# Patient Record
Sex: FEMALE | Race: WHITE | URBAN - METROPOLITAN AREA
[De-identification: names, ages, dates, MRNs, and addresses within clinical notes are randomized per-mention and may not be internally consistent; named-entity substitution may affect disease eponyms.]

---

## 2024-04-30 DIAGNOSIS — Z87.74 H/O MUSTARD PROCEDURE: ICD-10-CM

## 2024-04-30 DIAGNOSIS — Q20.3 TRANSPOSITION OF GREAT ARTERIES (HHS-HCC): ICD-10-CM

## 2024-04-30 DIAGNOSIS — I10 HYPERTENSION, UNSPECIFIED TYPE: ICD-10-CM

## 2024-04-30 DIAGNOSIS — I50.20 HEART FAILURE WITH REDUCED EJECTION FRACTION (MULTI): ICD-10-CM

## 2024-04-30 PROBLEM — Q24.9 CONGENITAL HEART DEFECT (HHS-HCC): Status: ACTIVE | Noted: 2024-04-30

## 2024-04-30 PROBLEM — N28.0 RENAL INFARCTION (MULTI): Status: ACTIVE | Noted: 2017-05-19

## 2024-04-30 PROBLEM — Z90.81 HISTORY OF SPLENECTOMY: Status: ACTIVE | Noted: 2019-04-17

## 2024-04-30 RX ORDER — FLUOXETINE 10 MG/1
10 CAPSULE ORAL DAILY
COMMUNITY
Start: 2024-02-28

## 2024-04-30 RX ORDER — RIVAROXABAN 10 MG/1
10 TABLET, FILM COATED ORAL DAILY
COMMUNITY

## 2024-04-30 RX ORDER — METFORMIN HYDROCHLORIDE 500 MG/1
500 TABLET, EXTENDED RELEASE ORAL
COMMUNITY
Start: 2024-04-13

## 2024-04-30 RX ORDER — HYDROCHLOROTHIAZIDE 25 MG/1
25 TABLET ORAL DAILY
COMMUNITY

## 2024-04-30 RX ORDER — LISINOPRIL 40 MG/1
40 TABLET ORAL DAILY
COMMUNITY

## 2024-06-03 ENCOUNTER — TELEPHONE (OUTPATIENT)
Dept: PEDIATRIC CARDIOLOGY | Facility: HOSPITAL | Age: 44
End: 2024-06-03
Payer: COMMERCIAL

## 2024-06-03 NOTE — PROGRESS NOTES
Patient Name: Juana Moreland   Provider: Sj Holder MD  : 1980  Date of Service: 2024    DIAGNOSES:     1. D-TGA  a. s/p Mustard Procedure (Operative details unavailable)  [place]   2. HFrEF  a. Echo 2015 (Scanned) shows RVEF (systemic ventricle) 45%  b. Echo 22 RVEF mildly reduced  c. Echo 2023 RVEF mildly reduced, unchanged  d. TTE 6/3/24 dilated hypertrophied RV with mild reduced systolic function, no change  3. HTN   a. On Lisinopril, HCTZ  4. H/o Loop Recorder Implant  a. Implanted x2 years with explant 2022  b. No arrhythmias  5. Renal Infarct      INTERVAL HISTORY:     I saw Juana Moreland today in the [facility] for Adults with Congenital Heart Disease, [facility] and [facility] [place] at [facility].    Juana is a 44 y/oF with D TGA, s/p Mustard procedure () in [place], [place]. She followed with a pediatric cardiologist in [place] until she moved to [place] . She followed at [facility], throughout both her pregnancies, up until spring 2022.. She underwent a Loop Recorder implant a few years ago because of unexplained renal infarct with explant  which showed no arrhythmias.     An echo was obtained in  which showed a reduced EF (RVEF 45%) of her systemic ventricle. She is currently being managed for HTN with Lisinopril and HCTZ. She also is taking Xarelto for the history of renal infarct.  She underwent sleep study in  which was negative for JAMIR.  LE edema in the past but resolved with hydrochlorothiazide.     She was last seen in  by me.  She had a resting bradycardia.  She had an exercise test during she achieved 11 METS with normal BP and HR response.  No med changes were made.    Today she reports that her dizzy spells with exertion have returned - one while walking to [employer].  She is also having recent palpitations while in bed.  No syncope.  No SOB or FONG.  " She also gets dizzy when getting up to urinate at night - this is new.  Takes BP meds at bedtime.  No other concerns.        ROS:   General: no fatigue, no fever, no weight loss, no excessive sweating, no decreased appetite  HEENT: no facial swelling, no hoarseness, no eye redness, no eye lid swelling  Cardiac: no fainting, no cyanosis, no chest pain, no palpitations  Respiratory: no shortness of breath, no coughing blood, no noisy breathing, no wheezing, no cough  GI: No abdomen pain, no constipation, no diarrhea, no vomiting  Musculoskeletal: no extremity swelling  Skin: no paleness, no rash, no yellow skin  Hematologic: no easy bruising, no easy bleeding  Neurologic: no seizures, no weakness    All systems negative unless otherwise stated.    CURRENT MEDS:    Current Outpatient Medications:     FLUoxetine (PROzac) 10 mg capsule, Take 1 capsule (10 mg) by mouth once daily., Disp: , Rfl:     hydroCHLOROthiazide (HYDRODiuril) 25 mg tablet, Take 1 tablet (25 mg) by mouth once daily. as directed, Disp: , Rfl:     lisinopril 40 mg tablet, Take 1 tablet (40 mg) by mouth once daily. as directed, Disp: , Rfl:     metFORMIN  mg 24 hr tablet, Take 1 tablet (500 mg) by mouth 2 times daily (morning and late afternoon)., Disp: , Rfl:     multivitamin tablet, Take 1 tablet by mouth once daily., Disp: , Rfl:     Xarelto 10 mg tablet, Take 1 tablet (10 mg) by mouth once daily., Disp: , Rfl:     PHYSICAL EXAM:  /73 (BP Location: Right arm)   Pulse 63   Ht 1.643 m (5' 4.69\")   Wt 74.8 kg (164 lb 14.5 oz)   SpO2 96%   BMI 27.71 kg/m²   Body mass index is 27.71 kg/m².    General: Well appearing and in no distress  HEENT: Moist mucous membranes  Neck: Supple, JVP normal, Carotid upstrokes brisk bilaterally and without bruits  Respiratory: Clear to ausculation bilaterally; no wheezing/rales/rhonchi; respirations non-labored  Cardiovascular: RRR,normal PMI, normal S1 and S2. No murmurs, gallops or rubs. " "  Gastrointestinal: soft, non-tender, no organomegaly, no abnormal aortic pulsation  Extremities: warm and well perfused with no cyanosis, clubbing, or edema  Neurologic: awake/alert, no focal deficits    DATA:    TTE 6/5/24 personally reviewed by me in clinic today:  Dilated hypertrophied RV with mild reduced systolic function, no change  Small D-shaped LV with normal function  Trivial TR  Phasic flow in atrial baffle    ECG 6/5/24  NSR at 66 with PAC, RVH with repol    Echo 6/21/2023 images reviewed and interpreted by Sj Holder MD  D TGA s/p Mustard  small hyperdynamic LV, D shaped LV  DIlated and hypertrophied RV with mildly decreased systolic function  Trivial to Mild TR  Phasic flow in atrial baffle     EKG 6/21/2023  Sinus Bradycardia with sinus arrhythmia  Left axis deviation   RVH     Echo 6/21/2022 images reviewed and interpreted by MD ELENI Fowler TGA s/p atrial switch  dilated and hypertrophied systemic RV  Mild TR (systemic AV valve)  Small and de shaped subpulmonic LV  Mildly reduced RV function (systemic)        Echo 9/3/2015  Normal LVEF  RVEF 45 %  Dilated RV/RA  Trival AR, mild MR, TR, no baffle shunt     Cardiac MRI 10/28/2020  RV EDVi 135 ml (Systemic)  LV EDVi 34 ml (Pulmonary)  Mild systemic RV dysfunction (40%)  No baffle leak         No results found for: \"WBC\", \"HGB\", \"MCV\", \"LABPLAT\"  No results found for: \"CREATININE\"    No components found for: \"MAGNESIUM\"  No results found for: \"PROT\", \"ALBUMIN\", \"ALT\", \"AST\"  No results found for: \"INR\", \"APTT\"      ASSESSMENT & PLAN:      1. D- TGA  s/p surgical repair in [place], [place]  Mustard Procedure     Echo today shows no issues with the systemic or pulmonary venous baffle/pathway.     F/u 1 year with Echo        2. H/O HFrEF  Decreased systemic RV function  Echo today shows mildly reduced RV systolic function.  Unchanged.         Overall she is doing exceptionally well without cardiac signs or symptoms.     Although GDMT has not " been defined for a systemic RV, she is currently only on ACE-I. Her HR is slow today and although we previously ruled out chronotropic incompetence, BB initiation is not an option given the significant resting bradycardia. She may benefit from addition of mineralocorticoid antagonism and SGLT2i. For now, given euvolemia and efficacy of current regimen for HTN and prior LE edema, we will make no changes and continue to evaluate as we move forward.      f/u 1 year with Echo     3. HTN  On Lisinpril, HCTZ with good control  BP today: 116/73     No changes     4. Sinus Bradycardia  Sinus bradycardia at last few office visits.   Reports of near syncope with exercise have returned along with some short lived palpitations.    - 14 day Holter today     Normal HR response to exercise     5. Renal infarct  Currently on Xarelto 10 mg daily  Will continue this medication     Next visit one year with echo    Thank you for allowing me to participate in the care of this patient.  Please don't hesitate to contact us with any questions or concerns.    Sincerely,    Sj Holder MD, MSc  Director, Adult Congenital Heart Disease Program  [facility]  [facility] [place]

## 2024-06-03 NOTE — TELEPHONE ENCOUNTER
06/03/24 at 12:01 PM     Spoke with: Patient   [phone]     Let Juana know Dr. Holder said she can come in this Wednesday, transferred to coordinator for scheduling.    - Tamra Wagner RN  [facility] Patient Navigator  [phone]

## 2024-06-03 NOTE — TELEPHONE ENCOUNTER
"06/03/24 at 11:18 AM     Spoke with: Patient   [phone]     Returned patient call about concerning symptoms.  Episodes started 3 nights ago (Thursday night into Friday), she woke up in the middle of the night to pee because she takes her lisinopril and hydrochlorothiazide in the evening, and she felt extremely heavy, a fluttering feeling in her chest, tingling. She went back to bed without further issue.  She felt fine all day Friday, skipped her lisinopril that night only, and had no issues over night.   Saturday, she was in a parade and felt \"ridiculous\" amount of fluttering in chest.   Sunday, when she woke up in the morning, she was sweating, felt pale, so she drank water and went to [employer]. She could barely make it from her car to the pew, felt like she was going to pass out. She made herself sit still and rest the rest of the day, and feels fine so far today.   These events happened a few years ago. They placed a loop recorder that didn't find anything. She said at that time she smoked and \"did stupid stuff\" that she no longer does, so she thought that caused those issues.   She has recently been eating very salty foods due to strong cravings, and agreed that consuming that much salt might be contributing to these symptoms.     She asked if she should come in earlier than her 6/19 appointment. Let her know I will update Dr. Holder and get back to her.    - Tamra Wagner RN  [facility]  [phone]    "

## 2024-06-04 ENCOUNTER — TELEPHONE (OUTPATIENT)
Dept: PEDIATRIC CARDIOLOGY | Facility: HOSPITAL | Age: 44
End: 2024-06-04
Payer: COMMERCIAL

## 2024-06-04 RX ORDER — BISMUTH SUBSALICYLATE 262 MG
1 TABLET,CHEWABLE ORAL DAILY
COMMUNITY

## 2024-06-04 NOTE — PATIENT INSTRUCTIONS
Your echo today is unchanged from the last few.  Overall good function - just mildly decreased.  No concerns.  No medication changes.  Check your blood pressure when you fell dizzy.  We will place a heart rhythm monitor today for 2 weeks.  Next visit one year with an echo.    [employer] Holter heart rhythm monitor:  We have applied a heart rhythm monitor to you today. Please wear it for 14 days.  The heart monitor may be worn during all activities including swimming up to 3ft (~1m) of water.  The monitor requires no special accommodation or restrictions. In the event the monitor is making a sound, this indicates a battery or skin contact issue.   After removing the monitor, return it in the supplied box via UPS. If you have any questions or need assistance with the Holter monitor, call [employer] at:  Patient services - [phone] (24 hours a day, 7 days a week)  [email or web address]  We will contact you with results; please allow up to two weeks from the time you send back the device.      Follow up with: Sj Holder MD    Date: 25   Time: Echo & EK:00  Appointment: 2:00   Location: [facility], [facility] - [address] [place] [address], [id number]   Recommendations:   Endocarditis prophylaxis: You do not need antibiotics before dental cleanings and procedures. Please see the dentist every 6 months for a teeth cleaning.     [facility] program contact information:  [facility] line: [phone]  [facility] Coordinator: [phone] (scheduling)  After hours: call [phone] to page on-call pediatric cardiology fellow at [phone]  Email: [email or web address]  MyChart [place]  **If your pharmacy has any problems requesting refills from us for your cardiac medications, please contact us.   
Vi

## 2024-06-04 NOTE — TELEPHONE ENCOUNTER
06/04/24    Spoke with: Patient   [phone]     Confirmed understanding of appointment details in previous note.    Reviewed medications and allergies.     Encouraged reaching out if there was anything else needed.      - Tamra Wagner RN  [facility] Patient Navigator  [phone]

## 2024-06-04 NOTE — TELEPHONE ENCOUNTER
24 at 11:23 AM     Attempted to reach: Patient   [phone]     Call went to voicemail, planned to leave message confirming appointment as follows:  Provider: Dr. Holder  Location:  [facility]  Date:   Time: Echo & EK                Appointment: 2    Call went to voicemail, unable to leave message.        - Tamra Wagner RN  [facility] Patient Navigator  [phone]

## 2024-06-05 ENCOUNTER — ANCILLARY PROCEDURE (OUTPATIENT)
Dept: PEDIATRIC CARDIOLOGY | Facility: HOSPITAL | Age: 44
End: 2024-06-05
Payer: COMMERCIAL

## 2024-06-05 ENCOUNTER — OFFICE VISIT (OUTPATIENT)
Dept: PEDIATRIC CARDIOLOGY | Facility: HOSPITAL | Age: 44
End: 2024-06-05
Payer: COMMERCIAL

## 2024-06-05 ENCOUNTER — HOSPITAL ENCOUNTER (OUTPATIENT)
Dept: PEDIATRIC CARDIOLOGY | Facility: HOSPITAL | Age: 44
Discharge: HOME | End: 2024-06-05
Payer: COMMERCIAL

## 2024-06-05 VITALS
BODY MASS INDEX: 27.47 KG/M2 | OXYGEN SATURATION: 96 % | HEART RATE: 63 BPM | SYSTOLIC BLOOD PRESSURE: 116 MMHG | HEIGHT: 65 IN | DIASTOLIC BLOOD PRESSURE: 73 MMHG | WEIGHT: 164.9 LBS

## 2024-06-05 VITALS
HEIGHT: 65 IN | WEIGHT: 164.9 LBS | OXYGEN SATURATION: 96 % | HEART RATE: 63 BPM | BODY MASS INDEX: 27.47 KG/M2 | SYSTOLIC BLOOD PRESSURE: 116 MMHG | DIASTOLIC BLOOD PRESSURE: 73 MMHG

## 2024-06-05 DIAGNOSIS — Z87.74 H/O MUSTARD PROCEDURE: ICD-10-CM

## 2024-06-05 DIAGNOSIS — Q20.3 TRANSPOSITION OF GREAT ARTERIES (HHS-HCC): Primary | ICD-10-CM

## 2024-06-05 DIAGNOSIS — I10 HYPERTENSION, UNSPECIFIED TYPE: ICD-10-CM

## 2024-06-05 DIAGNOSIS — Q20.3 TRANSPOSITION OF GREAT ARTERIES (HHS-HCC): ICD-10-CM

## 2024-06-05 DIAGNOSIS — R00.2 PALPITATIONS: ICD-10-CM

## 2024-06-05 DIAGNOSIS — I50.20 HEART FAILURE WITH REDUCED EJECTION FRACTION (MULTI): ICD-10-CM

## 2024-06-05 LAB
AORTIC VALVE PEAK VELOCITY: 1.3 M/S
ATRIAL RATE: 66 BPM
AV PEAK GRADIENT: 6.8 MMHG
BODY SURFACE AREA: 1.85 M2
MITRAL VALVE E/A RATIO: 1.81
MITRAL VALVE E/E' RATIO: 4.78
P AXIS: 89 DEGREES
P OFFSET: 204 MS
P ONSET: 161 MS
PR INTERVAL: 126 MS
PULMONIC VALVE PEAK GRADIENT: 4.9 MMHG
Q ONSET: 224 MS
QRS COUNT: 11 BEATS
QRS DURATION: 80 MS
QT INTERVAL: 456 MS
QTC CALCULATION(BAZETT): 478 MS
QTC FREDERICIA: 471 MS
R AXIS: 128 DEGREES
T AXIS: -86 DEGREES
T OFFSET: 452 MS
TRICUSPID ANNULAR PLANE SYSTOLIC EXCURSION: 1.3 CM
VENTRICULAR RATE: 66 BPM

## 2024-06-05 PROCEDURE — 93246 EXT ECG>7D<15D RECORDING: CPT

## 2024-06-05 PROCEDURE — 93303 ECHO TRANSTHORACIC: CPT | Performed by: PEDIATRICS

## 2024-06-05 PROCEDURE — 93325 DOPPLER ECHO COLOR FLOW MAPG: CPT | Performed by: PEDIATRICS

## 2024-06-05 PROCEDURE — 93320 DOPPLER ECHO COMPLETE: CPT

## 2024-06-05 PROCEDURE — 93010 ELECTROCARDIOGRAM REPORT: CPT | Performed by: INTERNAL MEDICINE

## 2024-06-05 PROCEDURE — 93320 DOPPLER ECHO COMPLETE: CPT | Performed by: PEDIATRICS

## 2024-06-05 PROCEDURE — 99214 OFFICE O/P EST MOD 30 MIN: CPT | Performed by: INTERNAL MEDICINE

## 2024-06-05 PROCEDURE — 93005 ELECTROCARDIOGRAM TRACING: CPT

## 2024-06-05 PROCEDURE — 3074F SYST BP LT 130 MM HG: CPT | Performed by: INTERNAL MEDICINE

## 2024-06-05 PROCEDURE — 3078F DIAST BP <80 MM HG: CPT | Performed by: INTERNAL MEDICINE

## 2024-06-05 NOTE — LETTER
2024     Luis Adan DO  [address] [facility] [address]    Patient: Juana Moreland   YOB: 1980   Date of Visit: 2024       Dear Dr. Luis Adan DO:    Thank you for referring Juana Moreland to me for evaluation. Below are my notes for this consultation.  If you have questions, please do not hesitate to call me. I look forward to following your patient along with you.       Sincerely,     Sj Holder MD      CC: No Recipients  ______________________________________________________________________________________    Patient Name: Juana Moreland   Provider: Sj Holder MD  : 1980  Date of Service: 2024    DIAGNOSES:     1. D-TGA  a. s/p Mustard Procedure (Operative details unavailable)  [place]   2. HFrEF  a. Echo 2015 (Scanned) shows RVEF (systemic ventricle) 45%  b. Echo 22 RVEF mildly reduced  c. Echo 2023 RVEF mildly reduced, unchanged  d. TTE 6/3/24 dilated hypertrophied RV with mild reduced systolic function, no change  3. HTN   a. On Lisinopril, HCTZ  4. H/o Loop Recorder Implant  a. Implanted x2 years with explant 2022  b. No arrhythmias  5. Renal Infarct      INTERVAL HISTORY:     I saw Juana Moreland today in the Center for Adults with Congenital Heart Disease, [facility] and [facility] [place] at [facility].    Juana is a 44 y/oF with D TGA, s/p Mustard procedure () in [place], [place]. She followed with a pediatric cardiologist in [place] until she moved to [place] . She followed at [facility], throughout both her pregnancies, up until spring 2022.. She underwent a Loop Recorder implant a few years ago because of unexplained renal infarct with explant  which showed no arrhythmias.     An echo was obtained in  which showed a reduced EF (RVEF 45%) of her systemic ventricle. She is currently being managed for  HTN with Lisinopril and HCTZ. She also is taking Xarelto for the history of renal infarct.  She underwent sleep study in 2023 which was negative for JAMIR.  LE edema in the past but resolved with hydrochlorothiazide.     She was last seen in 6/23 by me.  She had a resting bradycardia.  She had an exercise test during she achieved 11 METS with normal BP and HR response.  No med changes were made.    Today she reports that her dizzy spells with exertion have returned - one while walking to [employer].  She is also having recent palpitations while in bed.  No syncope.  No SOB or FONG.  She also gets dizzy when getting up to urinate at night - this is new.  Takes BP meds at bedtime.  No other concerns.        ROS:   General: no fatigue, no fever, no weight loss, no excessive sweating, no decreased appetite  HEENT: no facial swelling, no hoarseness, no eye redness, no eye lid swelling  Cardiac: no fainting, no cyanosis, no chest pain, no palpitations  Respiratory: no shortness of breath, no coughing blood, no noisy breathing, no wheezing, no cough  GI: No abdomen pain, no constipation, no diarrhea, no vomiting  Musculoskeletal: no extremity swelling  Skin: no paleness, no rash, no yellow skin  Hematologic: no easy bruising, no easy bleeding  Neurologic: no seizures, no weakness    All systems negative unless otherwise stated.    CURRENT MEDS:    Current Outpatient Medications:   •  FLUoxetine (PROzac) 10 mg capsule, Take 1 capsule (10 mg) by mouth once daily., Disp: , Rfl:   •  hydroCHLOROthiazide (HYDRODiuril) 25 mg tablet, Take 1 tablet (25 mg) by mouth once daily. as directed, Disp: , Rfl:   •  lisinopril 40 mg tablet, Take 1 tablet (40 mg) by mouth once daily. as directed, Disp: , Rfl:   •  metFORMIN  mg 24 hr tablet, Take 1 tablet (500 mg) by mouth 2 times daily (morning and late afternoon)., Disp: , Rfl:   •  multivitamin tablet, Take 1 tablet by mouth once daily., Disp: , Rfl:   •  Xarelto 10 mg tablet, Take 1  "tablet (10 mg) by mouth once daily., Disp: , Rfl:     PHYSICAL EXAM:  /73 (BP Location: Right arm)   Pulse 63   Ht 1.643 m (5' 4.69\")   Wt 74.8 kg (164 lb 14.5 oz)   SpO2 96%   BMI 27.71 kg/m²   Body mass index is 27.71 kg/m².    General: Well appearing and in no distress  HEENT: Moist mucous membranes  Neck: Supple, JVP normal, Carotid upstrokes brisk bilaterally and without bruits  Respiratory: Clear to ausculation bilaterally; no wheezing/rales/rhonchi; respirations non-labored  Cardiovascular: RRR,normal PMI, normal S1 and S2. No murmurs, gallops or rubs.   Gastrointestinal: soft, non-tender, no organomegaly, no abnormal aortic pulsation  Extremities: warm and well perfused with no cyanosis, clubbing, or edema  Neurologic: awake/alert, no focal deficits    DATA:    TTE 6/5/24 personally reviewed by me in clinic today:  Dilated hypertrophied RV with mild reduced systolic function, no change  Small D-shaped LV with normal function  Trivial TR  Phasic flow in atrial baffle    ECG 6/5/24  NSR at 67 with PAC, RVH with repol    Echo 6/21/2023 images reviewed and interpreted by MD ELENI Fowler TGA s/p Mustard  small hyperdynamic LV, D shaped LV  DIlated and hypertrophied RV with mildly decreased systolic function  Trivial to Mild TR  Phasic flow in atrial baffle     EKG 6/21/2023  Sinus Bradycardia with sinus arrhythmia  Left axis deviation   RVH     Echo 6/21/2022 images reviewed and interpreted by Sj Holder MD     D TGA s/p atrial switch  dilated and hypertrophied systemic RV  Mild TR (systemic AV valve)  Small and de shaped subpulmonic LV  Mildly reduced RV function (systemic)        Echo 9/3/2015  Normal LVEF  RVEF 45 %  Dilated RV/RA  Trival AR, mild MR, TR, no baffle shunt     Cardiac MRI 10/28/2020  RV EDVi 135 ml (Systemic)  LV EDVi 34 ml (Pulmonary)  Mild systemic RV dysfunction (40%)  No baffle leak         No results found for: \"WBC\", \"HGB\", \"MCV\", \"LABPLAT\"  No results found for: " "\"CREATININE\"    No components found for: \"MAGNESIUM\"  No results found for: \"PROT\", \"ALBUMIN\", \"ALT\", \"AST\"  No results found for: \"INR\", \"APTT\"      ASSESSMENT & PLAN:      1. D- TGA  s/p surgical repair in [place], [place]  Mustard Procedure     Echo today shows no issues with the systemic or pulmonary venous baffle/pathway.     F/u 1 year with Echo        2. H/O HFrEF  Decreased systemic RV function  Echo today shows mildly reduced RV systolic function.  Unchanged.         Overall she is doing exceptionally well without cardiac signs or symptoms.     Although GDMT has not been defined for a systemic RV, she is currently only on ACE-I. Her HR is slow today and although we previously ruled out chronotropic incompetence, BB initiation is not an option given the significant resting bradycardia. She may benefit from addition of mineralocorticoid antagonism and SGLT2i. For now, given euvolemia and efficacy of current regimen for HTN and prior LE edema, we will make no changes and continue to evaluate as we move forward.      f/u 1 year with Echo     3. HTN  On Lisinpril, HCTZ with good control  BP today: 116/73     No changes     4. Sinus Bradycardia  Sinus bradycardia at last few office visits.   Reports of near syncope with exercise have returned along with some short lived palpitations.    - 14 day Holter today     Normal HR response to exercise     5. Renal infarct  Currently on Xarelto 10 mg daily  Will continue this medication     Next visit one year with echo    Thank you for allowing me to participate in the care of this patient.  Please don't hesitate to contact us with any questions or concerns.    Sincerely,    Sj Holder MD, MSc  Director, Adult Congenital Heart Disease Program  [facility] [place]     "

## 2024-06-19 ENCOUNTER — APPOINTMENT (OUTPATIENT)
Dept: PEDIATRIC CARDIOLOGY | Facility: HOSPITAL | Age: 44
End: 2024-06-19
Payer: COMMERCIAL

## 2024-07-10 LAB — BODY SURFACE AREA: 1.85 M2

## 2024-07-11 ENCOUNTER — TELEPHONE (OUTPATIENT)
Dept: PEDIATRIC CARDIOLOGY | Facility: HOSPITAL | Age: 44
End: 2024-07-11
Payer: COMMERCIAL

## 2024-07-11 NOTE — TELEPHONE ENCOUNTER
----- Message from Sj Holder sent at 7/10/2024 10:34 AM EDT -----  Let her know that her monitor showed a hand full of short runs of fast heart beat - no more than 6 beats and then a few extra beats.  She had slow heart rate at times - which we know about, but nothing serious.  She did not trigger for symptoms so I don't know if any of these things are related to her palpitations or dizziness.  Overall, nothing of significant concern on the monitor.

## 2024-07-11 NOTE — TELEPHONE ENCOUNTER
07/11/24 at 4:07 PM     Spoke with: Patient   [phone]     Relayed heart monitor results, which were reassuring. Reviewed the times of fast beats, they weren't times she felt anything. Patient confirmed understanding, no further questions or issues to be addressed. Encouraged reaching out if there's anything else needed.       - Tamra Wagner RN  [facility]  [phone]

## 2024-08-09 DIAGNOSIS — N28.0 RENAL INFARCTION (MULTI): ICD-10-CM

## 2024-08-09 RX ORDER — RIVAROXABAN 10 MG/1
10 TABLET, FILM COATED ORAL DAILY
Qty: 90 TABLET | Refills: 3 | Status: SHIPPED | OUTPATIENT
Start: 2024-08-09 | End: 2025-08-09

## 2024-09-10 DIAGNOSIS — I10 HYPERTENSION, UNSPECIFIED TYPE: ICD-10-CM

## 2024-09-10 RX ORDER — LISINOPRIL 40 MG/1
40 TABLET ORAL DAILY
Qty: 90 TABLET | Refills: 3 | Status: SHIPPED | OUTPATIENT
Start: 2024-09-10 | End: 2025-09-10

## 2025-06-04 ENCOUNTER — APPOINTMENT (OUTPATIENT)
Dept: PEDIATRIC CARDIOLOGY | Facility: HOSPITAL | Age: 45
End: 2025-06-04
Payer: COMMERCIAL

## 2025-06-17 DIAGNOSIS — Q20.3 TRANSPOSITION OF GREAT ARTERIES (HHS-HCC): ICD-10-CM

## 2025-06-17 DIAGNOSIS — Z87.74 H/O MUSTARD PROCEDURE: ICD-10-CM

## 2025-09-03 ENCOUNTER — TELEPHONE (OUTPATIENT)
Dept: PEDIATRIC CARDIOLOGY | Facility: HOSPITAL | Age: 45
End: 2025-09-03
Payer: COMMERCIAL

## 2025-09-04 ENCOUNTER — OFFICE VISIT (OUTPATIENT)
Dept: PEDIATRIC CARDIOLOGY | Facility: HOSPITAL | Age: 45
End: 2025-09-04
Payer: COMMERCIAL

## 2025-09-04 ENCOUNTER — HOSPITAL ENCOUNTER (OUTPATIENT)
Dept: PEDIATRIC CARDIOLOGY | Facility: HOSPITAL | Age: 45
Discharge: HOME | End: 2025-09-04
Payer: COMMERCIAL

## 2025-09-04 ENCOUNTER — ANCILLARY PROCEDURE (OUTPATIENT)
Dept: PEDIATRIC CARDIOLOGY | Facility: HOSPITAL | Age: 45
End: 2025-09-04
Payer: COMMERCIAL

## 2025-09-04 VITALS
DIASTOLIC BLOOD PRESSURE: 77 MMHG | OXYGEN SATURATION: 98 % | BODY MASS INDEX: 27.51 KG/M2 | SYSTOLIC BLOOD PRESSURE: 129 MMHG | HEIGHT: 65 IN | WEIGHT: 165.12 LBS | HEART RATE: 58 BPM

## 2025-09-04 DIAGNOSIS — I50.20 HEART FAILURE WITH REDUCED EJECTION FRACTION: ICD-10-CM

## 2025-09-04 DIAGNOSIS — R00.1 BRADYCARDIA: ICD-10-CM

## 2025-09-04 DIAGNOSIS — Q20.3 TRANSPOSITION OF GREAT ARTERIES (HHS-HCC): Primary | ICD-10-CM

## 2025-09-04 DIAGNOSIS — Q20.3 TRANSPOSITION OF GREAT ARTERIES (HHS-HCC): ICD-10-CM

## 2025-09-04 DIAGNOSIS — Z87.74 H/O MUSTARD PROCEDURE: ICD-10-CM

## 2025-09-04 DIAGNOSIS — I10 HYPERTENSION, UNSPECIFIED TYPE: ICD-10-CM

## 2025-09-04 LAB
AORTIC VALVE PEAK VELOCITY: 1.23 M/S
ATRIAL RATE: 43 BPM
AV PEAK GRADIENT: 4.3 MMHG
BODY SURFACE AREA: 1.85 M2
MITRAL VALVE E/A RATIO: 1.94
P AXIS: 78 DEGREES
P OFFSET: 194 MS
P ONSET: 155 MS
PR INTERVAL: 138 MS
PULMONIC VALVE PEAK GRADIENT: 4.9 MMHG
Q ONSET: 224 MS
QRS COUNT: 7 BEATS
QRS DURATION: 82 MS
QT INTERVAL: 534 MS
QTC CALCULATION(BAZETT): 451 MS
QTC FREDERICIA: 477 MS
R AXIS: 143 DEGREES
T AXIS: -62 DEGREES
T OFFSET: 491 MS
TRICUSPID ANNULAR PLANE SYSTOLIC EXCURSION: 1.3 CM
VENTRICULAR RATE: 43 BPM

## 2025-09-04 PROCEDURE — 3074F SYST BP LT 130 MM HG: CPT | Performed by: PEDIATRICS

## 2025-09-04 PROCEDURE — 93005 ELECTROCARDIOGRAM TRACING: CPT | Performed by: PEDIATRICS

## 2025-09-04 PROCEDURE — 93242 EXT ECG>48HR<7D RECORDING: CPT

## 2025-09-04 PROCEDURE — 93320 DOPPLER ECHO COMPLETE: CPT | Performed by: PEDIATRICS

## 2025-09-04 PROCEDURE — 93303 ECHO TRANSTHORACIC: CPT | Performed by: PEDIATRICS

## 2025-09-04 PROCEDURE — 93010 ELECTROCARDIOGRAM REPORT: CPT | Performed by: PEDIATRICS

## 2025-09-04 PROCEDURE — 99202 OFFICE O/P NEW SF 15 MIN: CPT

## 2025-09-04 PROCEDURE — 99215 OFFICE O/P EST HI 40 MIN: CPT | Performed by: PEDIATRICS

## 2025-09-04 PROCEDURE — 3078F DIAST BP <80 MM HG: CPT | Performed by: PEDIATRICS

## 2025-09-04 PROCEDURE — 93325 DOPPLER ECHO COLOR FLOW MAPG: CPT | Performed by: PEDIATRICS

## 2025-09-04 PROCEDURE — 99212 OFFICE O/P EST SF 10 MIN: CPT | Mod: 25

## 2025-09-04 PROCEDURE — 3008F BODY MASS INDEX DOCD: CPT | Performed by: PEDIATRICS

## 2025-09-04 PROCEDURE — 93320 DOPPLER ECHO COMPLETE: CPT

## 2025-09-04 ASSESSMENT — ENCOUNTER SYMPTOMS
DEPRESSION: 0
OCCASIONAL FEELINGS OF UNSTEADINESS: 0
LOSS OF SENSATION IN FEET: 0